# Patient Record
Sex: MALE | URBAN - METROPOLITAN AREA
[De-identification: names, ages, dates, MRNs, and addresses within clinical notes are randomized per-mention and may not be internally consistent; named-entity substitution may affect disease eponyms.]

---

## 2021-02-25 ENCOUNTER — TELEPHONE (OUTPATIENT)
Dept: SCHEDULING | Facility: CLINIC | Age: 36
End: 2021-02-25

## 2021-02-25 NOTE — TELEPHONE ENCOUNTER
New Patient Appointment Request    Name of caller:  Patient's wife, Janneth      Diagnosis: NP -Went to ED at [place] Yesterday for CP.  Pt is having Numbness in right arm and still having CP.   Pt does not have PCP.     Referred by:  Pt's wife sees Dr. Wolf.     Best contact number: [phone]    Additional notes:  Not appt to offer.     TY

## 2021-02-25 NOTE — TELEPHONE ENCOUNTER
Barry, can we get this patient set up with a Gen Cardiology provider?  [facility] doesn't have NP appts for a few months.  tx

## 2021-02-25 NOTE — TELEPHONE ENCOUNTER
Ayo,  I left  telling patient/spouse we have not received.  I asked they call Javy Barragan again to have them fax.  Thank you.    isha CAGE

## 2021-02-25 NOTE — TELEPHONE ENCOUNTER
Barry on it. TY   Called and spoke with [place] [id number] [facility]. Pt denies wanting to speak to police or filing a report.

## 2021-02-25 NOTE — TELEPHONE ENCOUNTER
Spoke with patient via spouse.  Had Covid in December.  Symptoms X 10 days.  Recent CP non radiating.  Also numbness in arm in morning daily.  States sleeps on that side with arm elevated at night.  No SOB/nausea/sweating.  Constant 5/10 last 2 days.  Drinks Energy drink caffeinated 15 oz daily.  Also drinks caffeinated tea.  No smoking/no illicit drugs.  No family history of heart disease.  States lightheaded while at work yesterday.  Seen in ED at [facility] yesterday.  States EKG done, no labs.  Fax # provided to have sent to us.  Instructed to decrease caffeine intake,  Increase hydration 50-60 ounces water daily.  Instructed sleep on back off of arm.  911/ED for worsening symptoms.    Miracle,  Appointment soon but if urgent he will go to ED.  Prefers [facility] site.  Thank you.      Ayo, please keep an eye out for EKG.  Please let me/Elsy know when available.  Thank you.

## 2021-03-03 NOTE — TELEPHONE ENCOUNTER
Janneth, calling to follow up on appt with Dr Gibson.  Janneth states she just got the message.  Janneth would like to confirm appt for 3/5, if still avail.  Janneth can be reached at [phone].

## 2021-03-04 NOTE — TELEPHONE ENCOUNTER
Call for Aj lamb. Wife called asking if the 3/5 at 8am appnt is still available. Please call her at [phone] to advise

## 2021-03-04 NOTE — TELEPHONE ENCOUNTER
Pt was scheduled and josefina is aware and set up appt. Spoke with wife and she confirmed appt time and date.

## 2021-03-05 ENCOUNTER — OFFICE VISIT (OUTPATIENT)
Dept: CARDIOLOGY | Facility: CLINIC | Age: 36
End: 2021-03-05
Payer: COMMERCIAL

## 2021-03-05 VITALS
SYSTOLIC BLOOD PRESSURE: 120 MMHG | HEART RATE: 58 BPM | DIASTOLIC BLOOD PRESSURE: 80 MMHG | WEIGHT: 184.3 LBS | OXYGEN SATURATION: 95 % | BODY MASS INDEX: 26.39 KG/M2 | HEIGHT: 70 IN

## 2021-03-05 DIAGNOSIS — F14.11 HISTORY OF COCAINE ABUSE (CMS/HCC): ICD-10-CM

## 2021-03-05 DIAGNOSIS — R07.9 CHEST PAIN, UNSPECIFIED TYPE: ICD-10-CM

## 2021-03-05 DIAGNOSIS — R00.2 PALPITATIONS: Primary | ICD-10-CM

## 2021-03-05 DIAGNOSIS — F15.11: ICD-10-CM

## 2021-03-05 PROCEDURE — 93000 ELECTROCARDIOGRAM COMPLETE: CPT | Performed by: INTERNAL MEDICINE

## 2021-03-05 PROCEDURE — 99204 OFFICE O/P NEW MOD 45 MIN: CPT | Performed by: INTERNAL MEDICINE

## 2021-03-05 SDOH — HEALTH STABILITY: MENTAL HEALTH: HOW OFTEN DO YOU HAVE A DRINK CONTAINING ALCOHOL?: NEVER

## 2021-03-05 NOTE — PATIENT INSTRUCTIONS
Rubén Gibson MD  Cardiology    [facility]    [facility]  [facility] [address]  [address] [place] [address]    TEL  [phone]  [facility][email or web address]/[facility]         Tejas, it was a pleasure to see you in the clinic today. To recap, we discussed the following major points:     1. Please avoid caffeinated drinks to see if this helps.    2. I have arranged for you to have an ECG treadmill stress test done.      3.   As we discussed please avoid situations where passing out may be potentially dangerous in the mean time (high up on ladders etc)    All things being well I will follow up with you in the office in approximately 6 months       My general advice on well-being and self care:     1. Try to find time in the week to do things you enjoy.    2. At a minimum, if you are able to, try to walk at least 10,000 steps in a day. If you currently don't do any walking, build yourself up to this goal     3.   Do your best to maintain a balanced diet. An ideal diet should contain mostly fruits, vegetables, nuts, beans and whole grain foods. Try to minimize meat products and refined sugars in your diet. As a general rule, eating a mostly whole grain, high fiber, vegetable/fruit based diet for the majority of the week will have beneficial effects to your cardiovascular health.     4.   Take time in your day/week to focus on yourself, even if it is for a few minutes a day; especially if you are part of a busy household.      If you have any questions in the meantime please feel free to call on [phone] and I will get back to you as soon as I am able.     Best wishes         Rubén Gibson MD

## 2021-03-05 NOTE — PROGRESS NOTES
Rubén Gibson MD [facility]  Cardiology    [facility]    [facility]  [facility] [address]  [address] [place] [address]    TEL  [phone]  [facility]     3/5/2021    Re:  Dionte Okeefe  : 1985    Primary care: No primary care physician    Referring Physician: Self-referred    It was a pleasure to see Dionte Okeefe in the office for the first time today.  He was recently evaluated at the [facility] emergency room for a brief episode of palpitations was associated lightheadedness, bilateral upper extremity numbness and tingling. 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  As these occurred he developed some lightheadedness and subsequently started to feel a little anxious and developed numbness and tingling in his hands bilaterally.  Time he then went to the emergency room at [facility].  He was evaluated with ECG and blood work, advised to cut down on his caffeine use and follow-up with a cardiologist.    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  To maintain functionality on a relative lack of sleep he has been drinking very high volumes of [employer] energy drinks as well as coffee.  Tejas tells me that he has a history of polysubstance use (see below) but he is not currently using any recreational drugs.    His work is quite active, he has no exertional intolerance exertional dyspnea chest pain, orthopnea, PND or leg swelling.    He has no significant past medical history.    He has previously had episodic chest pain attributed to gastroesophageal reflux disease.  These are sporadic nonexertional symptoms which have not recurred for a little while.    PAST MEDICAL HISTORY:  History of GERD  Positive urine  "toxicology results for amphetamine, cocaine, opiates and oxycodone June 2020    FAMILY HISTORY:   There is no family history of premature coronary artery disease, sudden cardiac death or cardiomyopathy / need for transplant.     SOCIAL HISTORY:   Tejas does not smoke.  He does not drink alcohol.  There is a history of recreational substance use.  He has stopped since 2020.  He lives with his wife and 2 children.    MEDICATIONS:  No outpatient encounter medications on file as of 3/5/2021.    REVIEW OF SYSTEMS: Aside from what is mentioned above, a relevant complete review of systems was performed and was negative.    Visit Vitals  /80 (BP Location: Left upper arm, Patient Position: Sitting)   Pulse (!) 58   Ht 1.778 m (5' 10\")   Wt 83.6 kg (184 lb 4.8 oz)   SpO2 95%   BMI 26.44 kg/m²   Body surface area is 2.03 meters squared.  PHYSICAL EXAMINATION:  General: Pleasant and in no acute distress.  HEENT: No corneal arcus or xanthelasmas.  Sclerae are anicteric.  Dentition not examined as the patient is wearing a mask/face covering.  Neck: Supple.  JVP is 6 cm/H2O.  Carotids are equal with no audible bruits.  No lymphadenopathy or thyromegaly.  Heart: Normal S1 and S2.  No S4. No S3. No murmur.  Chest: Symmetrical.  Lungs: Clear bilaterally without rales, wheezes nor rhonchi.  Abdomen: Soft, nontender.  No masses or bruits.  No organomegaly.  Normal bowel sounds.  Extremities: No cyanosis, clubbing or edema.  Distal pulses are easily palpable.  Skin: Warm and dry and well perfused.  Neurologic: Alert and appropriate, moving all four extremities. Gait is normal  Psychiatric: Normal mood, affect & judgment.    DIAGNOSTIC DATA:    ECG: Sinus bradycardia, normal ECG    Labs:    Urine toxicology June 27, 2020  Amphetamines positive  Cocaine positive  Opiates positive  Oxycodone positive    ASSESSMENT/PLAN:    1. Palpitations  2. Atypical, likely noncardiac chest pain  3. History of substance use  4. Remote history of " atypical chest pain    I discussed with Tejas that his symptoms are likely due to excessive caffeine intake, they are already improving after he has cut down significantly.  I told him to stop completely if this is possible. Tejas assures me that he has not used cocaine or amphetamine products.     His QTC is normal and his examination findings are also normal.    Given his high risk work industry (construction) I have arranged for him to undergo an exercise treadmill stress test to ensure that he has no inducible arrhythmias with exercise.    Thank you for allowing me to share in the care of your patient.  I asked Tejas to return for a stress test in 2 weeks and will follow up with me on a routine basis beyond that.  If you have any further questions, please do not hesitate to contact me.    Sincerely,        Rubén Gibson MD, FACC

## 2021-03-22 ENCOUNTER — TELEPHONE (OUTPATIENT)
Dept: CARDIOLOGY | Facility: HOSPITAL | Age: 36
End: 2021-03-22

## 2021-03-22 NOTE — TELEPHONE ENCOUNTER
Spoke with patient, and states that he is feeling better, and MD told him he did not need to proceed with test if he was feeling better.  Will cancel test on schedule.

## 2023-09-07 ENCOUNTER — APPOINTMENT (OUTPATIENT)
Dept: URBAN - METROPOLITAN AREA CLINIC 193 | Age: 38
Setting detail: DERMATOLOGY
End: 2023-09-07

## 2023-09-07 DIAGNOSIS — L82.1 OTHER SEBORRHEIC KERATOSIS: ICD-10-CM

## 2023-09-07 DIAGNOSIS — L73.8 OTHER SPECIFIED FOLLICULAR DISORDERS: ICD-10-CM

## 2023-09-07 PROCEDURE — OTHER COUNSELING: OTHER

## 2023-09-07 PROCEDURE — 99202 OFFICE O/P NEW SF 15 MIN: CPT

## 2023-09-07 PROCEDURE — OTHER POINTED OUT BY PATIENT: OTHER

## 2023-09-07 PROCEDURE — OTHER MIPS QUALITY: OTHER

## 2023-09-07 ASSESSMENT — LOCATION ZONE DERM
LOCATION ZONE: FACE
LOCATION ZONE: TRUNK

## 2023-09-07 ASSESSMENT — LOCATION SIMPLE DESCRIPTION DERM
LOCATION SIMPLE: LEFT CHEEK
LOCATION SIMPLE: RIGHT UPPER BACK
LOCATION SIMPLE: RIGHT CHEEK
LOCATION SIMPLE: RIGHT EYEBROW

## 2023-09-07 ASSESSMENT — LOCATION DETAILED DESCRIPTION DERM
LOCATION DETAILED: LEFT SUPERIOR MEDIAL MALAR CHEEK
LOCATION DETAILED: RIGHT CENTRAL EYEBROW
LOCATION DETAILED: RIGHT SUPERIOR MEDIAL MALAR CHEEK
LOCATION DETAILED: LEFT CENTRAL MALAR CHEEK
LOCATION DETAILED: RIGHT SUPERIOR UPPER BACK

## 2023-09-07 NOTE — HPI: SKIN LESION
What Type Of Note Output Would You Prefer (Optional)?: Standard Output
Is This A New Presentation, Or A Follow-Up?: Skin Lesion
Additional History: Patient reports spots throughout his face and one spot on his back. Present for x years. Asymptomatic.